# Patient Record
(demographics unavailable — no encounter records)

---

## 2024-12-09 NOTE — DISCUSSION/SUMMARY
[Please See Note in Chart and Documentation in Trial DB] : Please see note in chart and documentation in Trial DB. [FreeTextEntry3] : 58 y.o. M here for annual [facility] monitoring visit, v2.   Pt reports sinus headaches and nasal congestion that began January 2004. He used flonase nasal spray. He was seen by ENT/allergist in 2018/2019 for these symptoms. He is now using nasal saline sprays.   Pt reports GERD symptoms including heartburn began December 2005. He was taking OTC Tums very frequently. He did not realize his chest discomfort was from reflux until very recently and had an EGD in 2020.   He reports thoughts of 9/11 coming back to him since his hospitalization for endocarditis. He used to speak to a therapist.   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 He worked on the [employer] from 09 11 2001 -12 31 2001, 12 HOURS EVERY DAY. Directly in the cloud of dust (or blackout) from the collapse of the 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 Patel to evaluate for mental health certification

## 2024-12-09 NOTE — HEALTH RISK ASSESSMENT
[Patient reported colonoscopy was normal] : Patient reported colonoscopy was normal [ColonoscopyDate] : 2020 [ColonoscopyComments] : hemorrhoids

## 2024-12-09 NOTE — DISCUSSION/SUMMARY
[Please See Note in Chart and Documentation in Trial DB] : Please see note in chart and documentation in Trial DB. [FreeTextEntry3] : 58 y.o. M here for annual [facility] monitoring visit, v2.   Pt reports sinus headaches and nasal congestion that began January 2004. He used flonase nasal spray. He was seen by ENT/allergist in 2018/2019 for these symptoms. He is now using nasal saline sprays.   Pt reports GERD symptoms including heartburn began December 2005. He was taking OTC Tums very frequently. He did not realize his chest discomfort was from reflux until very recently and had an EGD in 2020.   He reports thoughts of 9/11 coming back to him since his hospitalization for endocarditis. He used to speak to a therapist.   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 Patel to evaluate for mental health certification

## 2024-12-09 NOTE — PHYSICAL EXAM
[General Appearance - Alert] : alert [General Appearance - In No Acute Distress] : in no acute distress [Sclera] : the sclera and conjunctiva were normal [Extraocular Movements] : extraocular movements were intact [Outer Ear] : the ears and nose were normal in appearance [Oropharynx] : the oropharynx was normal [Neck Appearance] : the appearance of the neck was normal [Neck Cervical Mass (___cm)] : no neck mass was observed [Thyroid Diffuse Enlargement] : the thyroid was not enlarged [] : no respiratory distress [Respiration, Rhythm And Depth] : normal respiratory rhythm and effort [Exaggerated Use Of Accessory Muscles For Inspiration] : no accessory muscle use [Auscultation Breath Sounds / Voice Sounds] : lungs were clear to auscultation bilaterally [Edema] : there was no peripheral edema [Bowel Sounds] : normal bowel sounds [Abdomen Soft] : soft [Abdomen Tenderness] : non-tender [Oriented To Time, Place, And Person] : oriented to person, place, and time

## 2024-12-09 NOTE — HISTORY OF PRESENT ILLNESS
[FreeTextEntry1] : Pt reports sinus headaches and nasal congestion that began January 2004. He used flonase nasal spray. He was seen by ENT/allergist in 2018/2019 for these symptoms. He is now using nasal saline sprays.  Pt reports GERD symptoms including heartburn began December 2005. He was taking OTC Tums very frequently. He did not realize his chest discomfort was from reflux until very recently and had an EGD in 2020.  He reports thoughts of 9/11 coming back to him since his hospitalization for endocarditis. He used to speak to a therapist.